# Patient Record
(demographics unavailable — no encounter records)

---

## 2024-12-03 NOTE — REASON FOR VISIT
[Home] : at home, [unfilled] , at the time of the visit. [Medical Office: ([facility])___] : at the medical office located in  [Patient] : the patient [Follow-Up: _____] : a [unfilled] follow-up visit

## 2024-12-03 NOTE — REASON FOR VISIT
[Home] : at home, [unfilled] , at the time of the visit. [Medical Office: ([facility])___] : at the medical office located in  [Patient] : the patient [Follow-Up: _____] : a [unfilled] follow-up visit

## 2024-12-12 NOTE — ASSESSMENT
[FreeTextEntry1] : Follow up CT chest stable.  Recommend repeat ct chest in 1 year.  All questions were answered.

## 2024-12-12 NOTE — HISTORY OF PRESENT ILLNESS
[FreeTextEntry1] : 72 year old M s/p robotic assisted LLL wedge resection and MLND 12/6/2022 for adenocarcinoma presenting to thoracic surgery office for review of CT chest performed 11/22/2024.  CT chest 11/22/2024 as follows: IMPRESSION:  Few punctate lung nodules which likely represent impacted small airways. Patient without new complaints

## 2024-12-12 NOTE — DATA REVIEWED
[FreeTextEntry1] : Exam Date:      11/22/24 Exam:         CT CHEST Order#:       [id number]  INDICATION: Lung cancer status post resection   TECHNIQUE: Helical acquisition images of the chest without intravenous contrast. Maximum intensity projection images were generated.   COMPARISON: None.   FINDINGS:   LUNGS/AIRWAYS/PLEURA: Left upper lobectomy. No endobronchial lesion. Few sub-3 mm nodules in both apices. No pleural effusion.   LYMPH NODES/MEDIASTINUM: No lymphadenopathy.   HEART/VASCULATURE: Normal sized heart and aorta. No pericardial effusion.   UPPER ABDOMEN: Unremarkable.   BONES/SOFT TISSUES: Unremarkable.  IMPRESSION:   Few punctate lung nodules which likely represent impacted small airways.   --- End of Report ---

## 2024-12-12 NOTE — DATA REVIEWED
[FreeTextEntry1] : Exam Date:      11/22/24 Exam:         CT CHEST Order#:       [id number]  INDICATION: Lung cancer status post resection   TECHNIQUE: Helical acquisition images of the chest without intravenous contrast. Maximum intensity projection images were generated.   COMPARISON: None.   FINDINGS:   LUNGS/AIRWAYS/PLEURA: Left upper lobectomy. No endobronchial lesion. Few sub-3 mm nodules in both apices. No pleural effusion.   LYMPH NODES/MEDIASTINUM: No lymphadenopathy.   HEART/VASCULATURE: Normal sized heart and aorta. No pericardial effusion.   UPPER ABDOMEN: Unremarkable.   BONES/SOFT TISSUES: Unremarkable.  IMPRESSION:   Few punctate lung nodules which likely represent impacted small airways.   --- End of Report ---